# Patient Record
Sex: MALE | Race: WHITE | Employment: FULL TIME | URBAN - METROPOLITAN AREA
[De-identification: names, ages, dates, MRNs, and addresses within clinical notes are randomized per-mention and may not be internally consistent; named-entity substitution may affect disease eponyms.]

---

## 2019-12-24 ENCOUNTER — APPOINTMENT (OUTPATIENT)
Dept: GENERAL RADIOLOGY | Facility: CLINIC | Age: 39
End: 2019-12-24
Attending: EMERGENCY MEDICINE
Payer: OTHER MISCELLANEOUS

## 2019-12-24 ENCOUNTER — HOSPITAL ENCOUNTER (EMERGENCY)
Facility: CLINIC | Age: 39
Discharge: HOME | End: 2019-12-24
Attending: EMERGENCY MEDICINE | Admitting: EMERGENCY MEDICINE
Payer: OTHER MISCELLANEOUS

## 2019-12-24 VITALS
TEMPERATURE: 98.8 F | HEIGHT: 71 IN | OXYGEN SATURATION: 98 % | BODY MASS INDEX: 39.2 KG/M2 | RESPIRATION RATE: 16 BRPM | WEIGHT: 280 LBS | DIASTOLIC BLOOD PRESSURE: 91 MMHG | SYSTOLIC BLOOD PRESSURE: 143 MMHG

## 2019-12-24 DIAGNOSIS — S62.634B OPEN DISPLACED FRACTURE OF DISTAL PHALANX OF RIGHT RING FINGER, INITIAL ENCOUNTER: ICD-10-CM

## 2019-12-24 DIAGNOSIS — S61.319A LACERATION OF NAIL BED OF FINGER, INITIAL ENCOUNTER: ICD-10-CM

## 2019-12-24 DIAGNOSIS — S61.314A LACERATION OF RIGHT RING FINGER WITH DAMAGE TO NAIL, FOREIGN BODY PRESENCE UNSPECIFIED, INITIAL ENCOUNTER: ICD-10-CM

## 2019-12-24 PROCEDURE — 90715 TDAP VACCINE 7 YRS/> IM: CPT | Performed by: EMERGENCY MEDICINE

## 2019-12-24 PROCEDURE — 99284 EMERGENCY DEPT VISIT MOD MDM: CPT | Mod: 25

## 2019-12-24 PROCEDURE — 25000132 ZZH RX MED GY IP 250 OP 250 PS 637: Performed by: EMERGENCY MEDICINE

## 2019-12-24 PROCEDURE — 25000128 H RX IP 250 OP 636: Performed by: EMERGENCY MEDICINE

## 2019-12-24 PROCEDURE — 90471 IMMUNIZATION ADMIN: CPT

## 2019-12-24 PROCEDURE — 26765 TREAT FINGER FRACTURE EACH: CPT | Mod: F8

## 2019-12-24 PROCEDURE — 73140 X-RAY EXAM OF FINGER(S): CPT | Mod: RT

## 2019-12-24 RX ORDER — CEPHALEXIN 500 MG/1
500 CAPSULE ORAL ONCE
Status: COMPLETED | OUTPATIENT
Start: 2019-12-24 | End: 2019-12-24

## 2019-12-24 RX ORDER — CEPHALEXIN 500 MG/1
500 CAPSULE ORAL 3 TIMES DAILY
Qty: 21 CAPSULE | Refills: 0 | Status: SHIPPED | OUTPATIENT
Start: 2019-12-24 | End: 2019-12-31

## 2019-12-24 RX ADMIN — CLOSTRIDIUM TETANI TOXOID ANTIGEN (FORMALDEHYDE INACTIVATED), CORYNEBACTERIUM DIPHTHERIAE TOXOID ANTIGEN (FORMALDEHYDE INACTIVATED), BORDETELLA PERTUSSIS TOXOID ANTIGEN (GLUTARALDEHYDE INACTIVATED), BORDETELLA PERTUSSIS FILAMENTOUS HEMAGGLUTININ ANTIGEN (FORMALDEHYDE INACTIVATED), BORDETELLA PERTUSSIS PERTACTIN ANTIGEN, AND BORDETELLA PERTUSSIS FIMBRIAE 2/3 ANTIGEN 0.5 ML: 5; 2; 2.5; 5; 3; 5 INJECTION, SUSPENSION INTRAMUSCULAR at 03:53

## 2019-12-24 RX ADMIN — CEPHALEXIN 500 MG: 500 CAPSULE ORAL at 03:57

## 2019-12-24 ASSESSMENT — ENCOUNTER SYMPTOMS
NUMBNESS: 0
LIGHT-HEADEDNESS: 0
SHORTNESS OF BREATH: 0
WOUND: 1

## 2019-12-24 ASSESSMENT — MIFFLIN-ST. JEOR
SCORE: 2217.2
SCORE: 2217.2

## 2019-12-24 NOTE — ED AVS SNAPSHOT
Emergency Department  [address] [place] [id number]  Phone:  [phone]  Fax:  [phone]                                    Roberto Castañeda   MRN: [id number]    Department:   Emergency Department   Date of Visit:  12/24/2019           After Visit Summary Signature Page    I have received my discharge instructions, and my questions have been answered. I have discussed any challenges I see with this plan with the nurse or doctor.    ..........................................................................................................................................  Patient/Patient Representative Signature      ..........................................................................................................................................  Patient Representative Print Name and Relationship to Patient    ..................................................               ................................................  Date                                   Time    ..........................................................................................................................................  Reviewed by Signature/Title    ...................................................              ..............................................  Date                                               Time          22EPIC Rev 08/18

## 2019-12-24 NOTE — ED PROVIDER NOTES
"  History     Chief Complaint:  Laceration    HPI   Roberto Castañeda is a right hand dominant, 39 year old male who presents with a right fourth fingernail bed laceration.  Patient reports that his finger was pinched between two high tension cables while working on a guard rail a few hours ago. He was wearing gloves when the incident occurred and says he initially did not think that he injured himself and thought his finger was only pinched due to the tension. Denies obtaining any other injuries during the event. No shortness of breath, chest pain, lightheadedness, numbness, right hand pain, or any other complaints. Patient states his last tetanus booster was 8 years ago.     Allergies:  NKDA     Medications:    The patient is not currently taking any prescribed medications.      Past Medical History:    The patient does not have any past pertinent medical history.     Past Surgical History:    History reviewed. No pertinent surgical history.     Family History:    History reviewed. No pertinent family history.      Social History:  PCP: Physician No Ref-Primary  Not UTD on immunization.   Injury occurred at work.     Review of Systems   Respiratory: Negative for shortness of breath.    Cardiovascular: Negative for chest pain.   Skin: Positive for wound.   Neurological: Negative for light-headedness and numbness.   All other systems reviewed and are negative.      Physical Exam     Patient Vitals for the past 24 hrs:   BP Temp Temp src Heart Rate Resp SpO2 Height Weight   12/24/19 0203 (!) 143/91 98.8  F (37.1  C) Oral 100 16 98 % 1.803 m (5' 11\") 127 kg (280 lb)   12/24/19 0139 (!) 172/100 98.4  F (36.9  C) Oral 98 18 100 % 1.803 m (5' 11\") 127 kg (280 lb)        Physical Exam  Physical Exam   General:  Sitting on bed alone at bedside, comfortable appearing.   HENT:  No obvious trauma to head  Right Ear:  External ear normal.   Left Ear:  External ear normal.   Nose:  Nose normal.   Eyes:  Conjunctivae and EOM " are normal.  Neck: Normal range of motion. Neck supple. No tracheal deviation present.   Pulm/Chest: No respiratory distress  M/S: Normal range of motion.   Neuro: Alert. GCS 15.  Skin: Skin is warm and dry. No rash noted. Not diaphoretic. Right ring finger with laceration to the distal finger involving the nail bed and the ulnar and radial side of the finger measuring 4.0 cm.   Psych: Normal mood and affect. Behavior is normal.      Emergency Department Course     Imaging:  Radiographic findings were communicated with the patient who voiced understanding of the findings.    XR Finger Right G/E 2 Views  Laceration of the distal right fourth digit (ring finger) with distracted, comminuted fracture of the tuft of the distal phalanx. No retained radiopaque foreign body.  As read by Radiology.    Procedures:     PROCEDURE:  Digital Block  LOCATION:  Right fourth finger   ANESTHESIA: Digital block using 1% lidocaine, total of 3.5 mLs  PROCEDURE NOTE: The patient tolerated the procedure well with good relief of discomfort and there were no complications.      Laceration Repair        LACERATION:  A complex clean 4 cm laceration.      LOCATION:  Right ring finger       FUNCTION:  Distally sensation, circulation, motor and tendon function are intact.      ANESTHESIA:  Digital block as above      PREPARATION:  Irrigation and Scrubbing with Normal Saline and Shur Clens      DEBRIDEMENT:  no debridement      CLOSURE:   I removed the distal 2/3 of finger nail to expose the nail bed laceration. Wound was closed with 4 x 4.0 Nylon interrupted sutures on the radial and ulnar aspect of the nail. 4 x 5.0 fast absorbing gut sutures used to close the nail bed. Bacitracin and adaptic dressing applied. Alumafoam splint applied.     Interventions:  0353: Tdap 0.5 ml IM   0357: Keflex 500 mg PO  Digital block     Emergency Department Course:  0248: Nursing notes and vitals reviewed. I performed an exam of the patient as documented above.      Medicine administered as documented above.     The patient was sent for a finger xray while in the emergency department, findings above.     0330: I rechecked the patient and discussed the results of his workup thus far.     I sutured the patient's laceration, see above.     Alumafoam splint applied.     Findings and plan explained to the Patient. Patient discharged home with instructions regarding supportive care, medications, and reasons to return. The importance of close follow-up was reviewed. The patient was prescribed Keflex.     I personally answered all related questions prior to discharge.     Impression & Plan      Medical Decision Making:  Roberto Castañeda is a very pleasant 39 year old male who presents for evaluation of a complex fingernail bed laceration after an injury at work.  Xray reveals evidence of displaced Tuft fracture associated with this laceration.  The fracture was reduced during the repair of the laceration and after doing so the patient was Alumafoam splint placed. Tetanus was provided here since his last tetanus was 8 years ago and this is a high risk open fracture.  I had a thorough discussion about the risk and benefit of repair here in the emergency department and the patient consented for this.  The distal two thirds of the nail was removed.  The finger wound was closed as noted above; will have see hand surgery this week to address this for wound check.  There was no exposed bone/tendon/joint after laceration repair. There is no signs at this point of tendon injury but it is a difficult exam given extent of wound/laceration.  Sensation is intact distally in that finger.  Bacitracin was applied and dressing change instructions given to patient.  No other injury on head to toe trauma exam to warrant further workup today.  I left a voicemail on the Hand Surgeon's voicemail line to arrange close followup.    The treatment plan was discussed with the patient and they expressed  understanding of this plan and consented to the plan.  In addition, the patient will return to the emergency department if their symptoms persist, worsen, if new symptoms arise or if there is any concern as other pathology may be present that is not evident at this time. They also understand the importance of close follow up in the clinic and if unable to do so will return to the emergency department for a reevaluation. All questions were answered.     Diagnosis:    ICD-10-CM    1. Open displaced fracture of distal phalanx of right ring finger, initial encounter S62.634B    2. Laceration of nail bed of finger, initial encounter S61.319A    3. Laceration of right ring finger with damage to nail, foreign body presence unspecified, initial encounter S61.314A        Disposition:  discharged to home    Discharge Medications:  New Prescriptions    CEPHALEXIN (KEFLEX) 500 MG CAPSULE    Take 1 capsule (500 mg) by mouth 3 times daily for 7 days     Mireya RUST, roney serving as a scribe at 2:48 AM on 12/24/2019 to document services personally performed by Malcolm Briceño DO based on my observations and the provider's statements to me.     Mireya Danielle  12/24/2019    EMERGENCY DEPARTMENT       Malcolm Briceño DO  12/24/19 0457